# Patient Record
Sex: MALE | Employment: FULL TIME | URBAN - METROPOLITAN AREA
[De-identification: names, ages, dates, MRNs, and addresses within clinical notes are randomized per-mention and may not be internally consistent; named-entity substitution may affect disease eponyms.]

---

## 2023-10-30 ENCOUNTER — APPOINTMENT (OUTPATIENT)
Dept: SURGERY | Facility: CLINIC | Age: 28
End: 2023-10-30
Payer: COMMERCIAL

## 2023-11-09 ENCOUNTER — APPOINTMENT (OUTPATIENT)
Dept: SURGERY | Facility: CLINIC | Age: 28
End: 2023-11-09
Payer: COMMERCIAL

## 2023-12-07 ENCOUNTER — OFFICE VISIT (OUTPATIENT)
Dept: SURGERY | Facility: CLINIC | Age: 28
End: 2023-12-07
Payer: COMMERCIAL

## 2023-12-07 VITALS
TEMPERATURE: 97.2 F | WEIGHT: 260 LBS | SYSTOLIC BLOOD PRESSURE: 128 MMHG | DIASTOLIC BLOOD PRESSURE: 84 MMHG | HEART RATE: 78 BPM | OXYGEN SATURATION: 98 %

## 2023-12-07 DIAGNOSIS — L05.91 PILONIDAL CYST: Primary | ICD-10-CM

## 2023-12-07 PROCEDURE — 99203 OFFICE O/P NEW LOW 30 MIN: CPT | Performed by: SURGERY

## 2023-12-07 NOTE — PROGRESS NOTES
Subjective   Patient ID: Sher Juan is a 28 y.o. male who presents for No chief complaint on file..  Complaints and the pain in the tailbone area, with episodes of purulent discharges    HPI patient had abscess in the area of the tailbone, treated nonoperatively with antibiotics, improved.  Was referred to my attention for assessment for pilonidal cyst.  Review of Systems documentary consistent with the pain, purulent discharges in the tailbone area.  Review of all other 10 system is negative.  Physical Exam pupils equal bilaterally, mucosa moist, bilateral breath sounds, clear to auscultation, regular rate and rate, abdomen soft, nontender, palpable peripheral pulses, no focal neurological motor deficits.  There is evidence of the pyogenic granuloma with a palpable induration underneath the skin in the sacral area, consistent with complicated pilonidal cyst    Objective I reviewed all available data including lab results, radiological studies, previous reports and notes.    No diagnosis found.   There is no problem list on file for this patient.     No Known Allergies   Medication Documentation Review Audit    **Prior to Admission medications have not yet been reviewed**         No past medical history on file.  Social History     Tobacco Use   Smoking Status Not on file   Smokeless Tobacco Not on file     No family history on file.   No past surgical history on file.    Assessment/Plan   The patient with complicated, complex pilonidal cyst.  The patient will benefit from complete excision of the cyst, possible incision and drainage if abscess is detected.  Risks, benefits, alternative treatment were explained to the patient.  All questions were answered.  Informed consent was obtained.      Brandt Sabillon MD

## 2024-01-26 ENCOUNTER — OFFICE VISIT (OUTPATIENT)
Dept: SURGERY | Facility: CLINIC | Age: 29
End: 2024-01-26
Payer: COMMERCIAL

## 2024-01-26 DIAGNOSIS — L05.91 PILONIDAL CYST: ICD-10-CM

## 2024-01-26 PROCEDURE — 99024 POSTOP FOLLOW-UP VISIT: CPT | Performed by: PHYSICIAN ASSISTANT

## 2024-01-26 PROCEDURE — 1036F TOBACCO NON-USER: CPT | Performed by: PHYSICIAN ASSISTANT

## 2024-01-26 RX ORDER — CEPHALEXIN 500 MG/1
500 CAPSULE ORAL 3 TIMES DAILY
Qty: 21 CAPSULE | Refills: 0 | Status: SHIPPED | OUTPATIENT
Start: 2024-01-26 | End: 2024-02-02

## 2024-01-26 NOTE — PROGRESS NOTES
Subjective   Patient ID: Sher Juan is a 29 y.o. male who presents for Post-op (Excision pilonidal cyst done on 01/12/24).    HPI  29-year-old male little over 2 weeks status post excision of pilonidal sinus tract.  Patient is doing well he is complaining about the sutures hurting.  He thinks he saw some drainage on his Band-Aids that he has been applying usually like a brownish but yesterday there looks like there might be some purulent type drainage.  Still sensitive in the area.  He is here for suture removal    Review of Systems  Negative other than mentioned in HPI    ENT: No earache, no sore throat, no nosebleeds  Cardiovascular: No chest pain, no shortness of breath, no leg pain, no edema  Respiratory: No shortness of breath on exertion, no wheezing  Gastrointestinal: No abdominal pain, no melena, no nausea, vomiting and/or diarrhea  Musculoskeletal: No pain moving all extremities, no back pain ambulating normally  Skin: No rashes, no lesions, and no skin changes  Neuro: No headache, no confusion, no numbness and tingling  Psychiatric, normal mood, not suicidal, not homicidal, feeling good      Physical Exam  Eyes: Conjunctiva non -icteric and eye lids are without obvious rash or drooping. Pupils are symmetric.   Ears, Nose, Mouth, and Throat: External ears and nose appear to be without deformity or rash. No lesions or masses noted. Hearing is grossly intact.   Neck:. No JVD noted, tracheal position is midline. No thyromegaly, no thyroid nodules  Head and Face: Examination of the head and face revealed no abnormalities.   Respiratory: No gasping or shortness of breath noted, no use of accessory muscles noted. Clear to auscultate bilaterally  Cardiovascular: Examination for edema is normal. Regular rate and rhythm S1 S2 without murmurs  GI: Abdomen non tender to palpation, bowel sounds present no hepatosplenomegaly  Tailbone area:.  4 sutures removed.  Incisions are closed.  I do not see anything but  kind of like a serosanguineous type drainage currently.  Skin: No rashes or open lesions/ulcers identified on skin.   Musk: Digits/nails show no clubbing or cyanosis. No asymmetry or masses noted of the musculature. Examination of the muscles/joints/bones show normal range of motion. Gait is grossly normally.   Neurologic: Cranial nerves II- XII intact, motor strength 5/5 muscle strength of the lower extremities bilaterally and equal.      Objective     No diagnosis found.   There is no problem list on file for this patient.     No Known Allergies   Medication Documentation Review Audit       Reviewed by Meera Fuller MA (Medical Assistant) on 01/26/24 at 0808      Medication Order Taking? Sig Documenting Provider Last Dose Status            No Medications to Display                                   History reviewed. No pertinent past medical history.  Social History     Tobacco Use   Smoking Status Never   Smokeless Tobacco Never     No family history on file.   History reviewed. No pertinent surgical history.    Assessment/Plan   Patient was instructed to wash area with hot soapy water at least twice a day  Will send Keflex to patient's pharmacy for skin coverage because of the drainage that he said he had at home  Patient can return to work on Monday  Patient should call the office if he has any issues    Encounter Diagnosis   Name Primary?    Pilonidal cyst      I have reviewed all data including labs,radiologic and previous reports.  Pathology showed sinus tract inflammation    **Portions of this medical record have been created using voice recognition software and may have minor errors which are inherent in voice recognition systems. It has not been fully edited for typographical or grammatical errors**

## 2024-04-17 ENCOUNTER — OFFICE VISIT (OUTPATIENT)
Dept: SURGERY | Facility: CLINIC | Age: 29
End: 2024-04-17
Payer: COMMERCIAL

## 2024-04-17 VITALS
RESPIRATION RATE: 16 BRPM | DIASTOLIC BLOOD PRESSURE: 80 MMHG | OXYGEN SATURATION: 97 % | HEART RATE: 75 BPM | SYSTOLIC BLOOD PRESSURE: 126 MMHG | BODY MASS INDEX: 37.22 KG/M2 | TEMPERATURE: 97.5 F | WEIGHT: 260 LBS | HEIGHT: 70 IN

## 2024-04-17 DIAGNOSIS — L05.91 PILONIDAL CYST: Primary | ICD-10-CM

## 2024-04-17 PROCEDURE — 1036F TOBACCO NON-USER: CPT | Performed by: SURGERY

## 2024-04-17 PROCEDURE — 99212 OFFICE O/P EST SF 10 MIN: CPT | Performed by: SURGERY

## 2024-04-17 NOTE — PROGRESS NOTES
Subjective   Patient ID: Sher Juan is a 29 y.o. male who presents for Follow-up and Cyst ([facility]- Pilonidal cyst done 1/12/24 patient believes the cyst has returned. Was given Amoxicillin for 10 days finished a week ago  ).    HPI as described above  Review of Systems commentary consistent with some discomfort in the area of pilonidal cyst excision  Physical Exam on the physical exam there is no evidence of cellulitis.  Very small area of scab in the mid of the scar without drainage.  Patient was treated with antibiotics for the last week.  Pupils equal bilaterally, mucosa moist, bilateral breath sounds, regular rate, no focal neurological motor deficits  Objective     No diagnosis found.   There is no problem list on file for this patient.     No Known Allergies   Medication Documentation Review Audit       Reviewed by Verónica Martinez CMA (Medical Assistant) on 04/17/24 at 1237      Medication Order Taking? Sig Documenting Provider Last Dose Status            No Medications to Display                                   No past medical history on file.  Social History     Tobacco Use   Smoking Status Never   Smokeless Tobacco Never     No family history on file.  Family history was reviewed, it is irrelevant patient complaints and problems  No past surgical history on file.  Excision of pilonidal cyst    Assessment/Plan   Patient with a history of excision of pilonidal cyst.  Small area of drainage in the middle of the incision.  No evidence of cellulitis.  No further treatment is indicated except I recommend to scrub area with soap and water twice a day.  I recommend to return to office if patient develop any discharges from this area.  At that time patient may benefit from dressing changes with open management of the wound with packing of the wound for secondary closure.      Brandt Sabillon MD